# Patient Record
Sex: MALE | Race: BLACK OR AFRICAN AMERICAN | Employment: FULL TIME | URBAN - METROPOLITAN AREA
[De-identification: names, ages, dates, MRNs, and addresses within clinical notes are randomized per-mention and may not be internally consistent; named-entity substitution may affect disease eponyms.]

---

## 2019-09-24 ENCOUNTER — HOSPITAL ENCOUNTER (EMERGENCY)
Age: 29
Discharge: HOME | End: 2019-09-24
Attending: EMERGENCY MEDICINE
Payer: COMMERCIAL

## 2019-09-24 VITALS
OXYGEN SATURATION: 98 % | BODY MASS INDEX: 20.99 KG/M2 | RESPIRATION RATE: 16 BRPM | HEART RATE: 79 BPM | WEIGHT: 155 LBS | SYSTOLIC BLOOD PRESSURE: 133 MMHG | HEIGHT: 72 IN | TEMPERATURE: 98.1 F | DIASTOLIC BLOOD PRESSURE: 71 MMHG

## 2019-09-24 DIAGNOSIS — B35.3 TINEA PEDIS OF RIGHT FOOT: Primary | ICD-10-CM

## 2019-09-24 DIAGNOSIS — L08.9 STAPH SKIN INFECTION: ICD-10-CM

## 2019-09-24 DIAGNOSIS — B95.8 STAPH SKIN INFECTION: ICD-10-CM

## 2019-09-24 PROCEDURE — 99282 EMERGENCY DEPT VISIT SF MDM: CPT

## 2019-09-24 RX ORDER — CEPHALEXIN 500 MG/1
500 CAPSULE ORAL 4 TIMES DAILY
Qty: 28 CAP | Refills: 0 | Status: SHIPPED | OUTPATIENT
Start: 2019-09-24 | End: 2019-10-01

## 2019-09-24 NOTE — DISCHARGE INSTRUCTIONS
Patient Education        Learning About Staph Infection  What is a staph infection? Staphylococcus aureus (staph) is a type of bacteria that can cause infections. Staph bacteria normally live on the skin. They don't usually cause problems. They only become a problem when they cause infection. The infection has a higher chance of becoming serious in people who are weak or ill or who are being treated in the hospital. Sometimes staph bacteria can cause more serious widespread infection. In the hospital, staph infections are more likely to occur in wounds, burns, or places where there is a break in the skin or where tubes enter the body. In the community, these infections are more likely to occur among people who have cuts or wounds and who have close contact with one another. What are the symptoms? Symptoms of a staph infection depend on where the infection is. If the infection is:  · In a wound, that area of your skin may be red or tender. · On your skin, you may get a red, tender boil or abscess. You may think you have been bitten by a spider or insect. · In your urine, you may have symptoms of a urinary tract infection. These include burning when you urinate. · In your blood or more widespread, you may have a fever and feel very ill. How is a staph infection treated? The doctor will take a sample of your infected wound or a blood or urine sample. The sample is tested to see which antibiotics can kill the bacteria in it. This test may take several days. If you have a staph infection, your doctor may:  · Drain your wound. · Give you antibiotics as pills or through a needle put in your vein (IV). You may have to stay in the hospital for treatment. In the hospital, you may be kept apart from others. This is to reduce the chances of spreading the bacteria. How can you prevent a staph infection? · Practice good hygiene. ? Wash your hands often with soap and clean, running water.  You can also use an alcohol-based hand . Hand-washing is the best way to avoid spreading the bacteria. ? Keep cuts and scrapes clean. Cover them with a bandage. Avoid contact with other people's wounds or bandages. ? Don't share personal items such as towels, washcloths, razors, or clothing. ? Keep your environment clean by using a disinfectant to wipe surfaces you touch a lot. These include countertops, doorknobs, and light switches. · Your doctor may give you an ointment to put inside your nose. This is to kill staph bacteria that may cause another infection. · Be smart about using antibiotics. Antibiotics can help treat bacterial infections, but they can't cure viral infections. Always ask your doctor if antibiotics are the best treatment. · If your doctor prescribed antibiotics, take them as directed. Do not stop taking them just because you feel better. You need to take the full course of antibiotics. · If you're in the hospital, remind doctors and nurses to wash their hands before they touch you. Follow-up care is a key part of your treatment and safety. Be sure to make and go to all appointments, and call your doctor if you are having problems. It's also a good idea to know your test results and keep a list of the medicines you take. Where can you learn more? Go to http://la-warren.info/. Enter C598 in the search box to learn more about \"Learning About Staph Infection. \"  Current as of: June 9, 2019  Content Version: 12.2  © 3096-0484 [employer]. Care instructions adapted under license by [employer] (which disclaims liability or warranty for this information). If you have questions about a medical condition or this instruction, always ask your healthcare professional. [place] [address] any warranty or liability for your use of this information.          Patient Education        Athlete's Foot: Care Instructions  Your Care Instructions    Athlete's foot is an itchy rash on the foot caused by an infection with a fungus. You can get it by going barefoot in wet public areas, such as swimming pools or locker rooms. Many times there is no clear reason why you get athlete's foot. You can easily treat athlete's foot by putting medicine on your feet for 1 to 6 weeks. In some cases, a doctor may prescribe pills to kill the fungus. Follow-up care is a key part of your treatment and safety. Be sure to make and go to all appointments, and call your doctor if you are having problems. It's also a good idea to know your test results and keep a list of the medicines you take. How can you care for yourself at home? · Your doctor may suggest an over-the counter lotion or spray or may prescribe a medicine. Take your medicines exactly as prescribed. Call your doctor if you think you are having a problem with your medicine. · Keep your feet clean and dry. · When you get dressed, put your socks on before your underwear. This can prevent the fungus from spreading from your feet to your groin. To prevent athlete's foot  · Wear flip-flops or other shower sandals in public locker rooms and showers and by the pool. · Dry between your toes after swimming or bathing. · Wear leather shoes or sandals, which let air get to your feet. · Change your socks as needed so your feet stay as dry as possible. · Use antifungal powder on your feet. When should you call for help? Watch closely for changes in your health, and be sure to contact your doctor if:    · You do not get better as expected. Where can you learn more? Go to http://la-warren.info/. Enter M498 in the search box to learn more about \"Athlete's Foot: Care Instructions. \"  Current as of: April 1, 2019  Content Version: 12.2  © 2272-1140 [employer]. Care instructions adapted under license by [employer] (which disclaims liability or warranty for this information).  If you have questions about a medical condition or this instruction, always ask your healthcare professional. [place] [address] any warranty or liability for your use of this information.

## 2019-09-24 NOTE — ED PROVIDER NOTES
EMERGENCY DEPARTMENT HISTORY AND PHYSICAL EXAM    Date: 9/24/2019  Patient Name: Latoya Garcia    History of Presenting Illness     Chief Complaint   Patient presents with    Skin Problem         History Provided By: Patient    HPI: Latoya Garcia is a 30 y.o. male with No significant past medical history who presents with rash to the right toes. Patient states he dropped a box of paper towels on his foot 1 week ago while at work. Patient states once he got home his toes seem irritated so he started scratching them. Patient states now he has sores to the toes and pain in between. Patient rates pain 7 out of 10. Patient states he has been putting antibiotic ointment on the toes with minimal relief. PCP: None    Current Outpatient Medications   Medication Sig Dispense Refill    cephALEXin (KEFLEX) 500 mg capsule Take 1 Cap by mouth four (4) times daily for 7 days. 28 Cap 0    terbinafine HCl (LAMISIL) 1 % topical cream Apply  to affected area daily for 7 days. Apply between the toes 30 g 0       Past History     Past Medical History:  No past medical history on file. Past Surgical History:  No past surgical history on file. Family History:  No family history on file. Social History:  Social History     Tobacco Use    Smoking status: Not on file   Substance Use Topics    Alcohol use: Not on file    Drug use: Not on file       Allergies:  No Known Allergies      Review of Systems   Review of Systems   Constitutional: Negative for chills and fever. Musculoskeletal: Positive for arthralgias. Skin: Positive for rash. Neurological: Negative for speech difficulty and weakness. Psychiatric/Behavioral: Positive for self-injury. All other systems reviewed and are negative.       Physical Exam     Vitals:    09/24/19 1553   BP: 133/71   Pulse: 79   Resp: 16   Temp: 98.1 °F (36.7 °C)   SpO2: 98%   Weight: 70.3 kg (155 lb)   Height: 6' (1.829 m)     Physical Exam   Constitutional: He is oriented to person, place, and time. He appears well-developed and well-nourished. No distress. HENT:   Head: Normocephalic and atraumatic. Mouth/Throat: Oropharynx is clear and moist. No oropharyngeal exudate. Eyes: Conjunctivae are normal.   Cardiovascular: Normal rate, regular rhythm and normal heart sounds. Pulmonary/Chest: Effort normal and breath sounds normal. No respiratory distress. He has no wheezes. He has no rales. Musculoskeletal: Normal range of motion. Feet:    Neurological: He is alert and oriented to person, place, and time. Skin: Skin is warm and dry. Nursing note and vitals reviewed. At 5:58 PM      Diagnostic Study Results     Labs -   No results found for this or any previous visit (from the past 12 hour(s)). Radiologic Studies -   No orders to display     CT Results  (Last 48 hours)    None        CXR Results  (Last 48 hours)    None            Medical Decision Making   I am the first provider for this patient. I reviewed the vital signs, available nursing notes, past medical history, past surgical history, family history and social history. Vital Signs-Reviewed the patient's vital signs. Disposition:  Discharged    DISCHARGE NOTE:   4:47 PM      Care plan outlined and precautions discussed. Patient has no new complaints, changes, or physical findings. All medications were reviewed with the patient; will d/c home. All of pt's questions and concerns were addressed. Patient was instructed and agrees to follow up with podiatry PRN, as well as to return to the ED upon further deterioration. Patient is ready to go home.     Follow-up Information     Follow up With Specialties Details Why Contact Info    Dorcas Craig DPM Podiatry In 1 week If symptoms worsen [address] [facility] [address]  [phone]            Discharge Medication List as of 9/24/2019  4:47 PM      START taking these medications    Details   cephALEXin (KEFLEX) 500 mg capsule Take 1 Cap by mouth four (4) times daily for 7 days. , Normal, Disp-28 Cap, R-0      terbinafine HCl (LAMISIL) 1 % topical cream Apply  to affected area daily for 7 days. Apply between the toes, Normal, Disp-30 g, R-0             Provider Notes (Medical Decision Making):   DDX: Tinea pedis with secondary infection, contact dermatitis, allergic reaction      Procedures:  Procedures    Please note that this dictation was completed with Dragon, computer voice recognition software. Quite often unanticipated grammatical, syntax, homophones, and other interpretive errors are inadvertently transcribed by the computer software. Please disregard these errors. Additionally, please excuse any errors that have escaped final proofreading. Diagnosis     Clinical Impression:   1. Tinea pedis of right foot    2.  Staph skin infection

## 2019-09-24 NOTE — ED NOTES
Discharge instructions were given to the patient by Kathryn Yip RN. The patient left the Emergency Department ambulatory, alert and oriented and in no acute distress with 2 prescriptions. The patient was encouraged to call or return to the ED for worsening issues or problems and was encouraged to schedule a follow up appointment for continuing care. The patient verbalized understanding of discharge instructions and prescriptions, all questions were answered. The patient has no further concerns at this time.

## 2019-09-24 NOTE — ED NOTES
Pt presents to ED ambulatory complaining of flaky skin to the right foot with multiple blisters that appear in various stages of healing. Pt reports he accidentally dropped some boxes on his foot at work approx 1 week ago and it has had trouble healing since. Pt is alert and oriented x 4, RR even and unlabored, skin is warm and dry. Assessment completed and pt updated on plan of care. Emergency Department Nursing Plan of Care       The Nursing Plan of Care is developed from the Nursing assessment and Emergency Department Attending provider initial evaluation. The plan of care may be reviewed in the ED Provider note.     The Plan of Care was developed with the following considerations:   Patient / Family readiness to learn indicated by:verbalized understanding  Persons(s) to be included in education: patient  Barriers to Learning/Limitations:No    Signed     Alois Hodgkin, RN    9/24/2019   5:17 PM

## 2019-09-24 NOTE — ED TRIAGE NOTES
Reports dropping box of paper towels on R toes. States there is skin irritation on upper and lower portions of R toes.